# Patient Record
Sex: FEMALE | Race: ASIAN | NOT HISPANIC OR LATINO | URBAN - METROPOLITAN AREA
[De-identification: names, ages, dates, MRNs, and addresses within clinical notes are randomized per-mention and may not be internally consistent; named-entity substitution may affect disease eponyms.]

---

## 2022-12-31 ENCOUNTER — EMERGENCY (EMERGENCY)
Facility: HOSPITAL | Age: 87
LOS: 1 days | Discharge: HOME | End: 2022-12-31
Attending: STUDENT IN AN ORGANIZED HEALTH CARE EDUCATION/TRAINING PROGRAM
Payer: MEDICARE

## 2022-12-31 VITALS
SYSTOLIC BLOOD PRESSURE: 186 MMHG | OXYGEN SATURATION: 98 % | DIASTOLIC BLOOD PRESSURE: 96 MMHG | WEIGHT: 125 LBS | TEMPERATURE: 98 F | HEART RATE: 87 BPM | RESPIRATION RATE: 18 BRPM

## 2022-12-31 VITALS
OXYGEN SATURATION: 96 % | HEART RATE: 78 BPM | SYSTOLIC BLOOD PRESSURE: 179 MMHG | RESPIRATION RATE: 18 BRPM | TEMPERATURE: 98 F | DIASTOLIC BLOOD PRESSURE: 93 MMHG

## 2022-12-31 PROCEDURE — 99283 EMERGENCY DEPT VISIT LOW MDM: CPT

## 2022-12-31 PROCEDURE — 99284 EMERGENCY DEPT VISIT MOD MDM: CPT

## 2022-12-31 RX ORDER — LIDOCAINE 4 G/100G
1 CREAM TOPICAL ONCE
Refills: 0 | Status: COMPLETED | OUTPATIENT
Start: 2022-12-31 | End: 2022-12-31

## 2022-12-31 RX ORDER — CYCLOBENZAPRINE HYDROCHLORIDE 10 MG/1
5 TABLET, FILM COATED ORAL ONCE
Refills: 0 | Status: COMPLETED | OUTPATIENT
Start: 2022-12-31 | End: 2022-12-31

## 2022-12-31 RX ORDER — CYCLOBENZAPRINE HYDROCHLORIDE 10 MG/1
1 TABLET, FILM COATED ORAL
Qty: 12 | Refills: 0
Start: 2022-12-31 | End: 2023-01-02

## 2022-12-31 RX ORDER — IBUPROFEN 200 MG
600 TABLET ORAL ONCE
Refills: 0 | Status: COMPLETED | OUTPATIENT
Start: 2022-12-31 | End: 2022-12-31

## 2022-12-31 RX ORDER — IBUPROFEN 200 MG
1 TABLET ORAL
Qty: 30 | Refills: 0
Start: 2022-12-31 | End: 2023-01-09

## 2022-12-31 RX ORDER — ACETAMINOPHEN 500 MG
2 TABLET ORAL
Qty: 56 | Refills: 0
Start: 2022-12-31 | End: 2023-01-06

## 2022-12-31 RX ORDER — ACETAMINOPHEN 500 MG
975 TABLET ORAL ONCE
Refills: 0 | Status: COMPLETED | OUTPATIENT
Start: 2022-12-31 | End: 2022-12-31

## 2022-12-31 RX ADMIN — Medication 600 MILLIGRAM(S): at 10:28

## 2022-12-31 RX ADMIN — Medication 975 MILLIGRAM(S): at 10:28

## 2022-12-31 RX ADMIN — CYCLOBENZAPRINE HYDROCHLORIDE 5 MILLIGRAM(S): 10 TABLET, FILM COATED ORAL at 11:25

## 2022-12-31 RX ADMIN — Medication 975 MILLIGRAM(S): at 09:40

## 2022-12-31 RX ADMIN — LIDOCAINE 1 PATCH: 4 CREAM TOPICAL at 09:39

## 2022-12-31 RX ADMIN — Medication 600 MILLIGRAM(S): at 09:40

## 2022-12-31 NOTE — ED PROVIDER NOTE - PROGRESS NOTE DETAILS
pain improving. able to stand up and walk now. will give flexeril. stable for outpatient followup. Anshu Richmond

## 2022-12-31 NOTE — ED PROVIDER NOTE - PATIENT PORTAL LINK FT
You can access the FollowMyHealth Patient Portal offered by [facility] by registering at the following website: [email or web address]. By joining [facility]’s FollowMyHealth portal, you will also be able to view your health information using other applications (apps) compatible with our system.

## 2022-12-31 NOTE — ED PROVIDER NOTE - NSFOLLOWUPINSTRUCTIONS_ED_ALL_ED_FT
You were seen in the emergency department for lower back pain.     Please follow-up with your primary care doctor in the next 24-48 hours.     Please take Ibuprofen and Tylenol as prescribed on the bottles for pain control.     If you have any worsening symptoms, severe headache, chest pain, trouble breathing, weakness, numbness or tingling in your legs, please return to the emergency department.

## 2022-12-31 NOTE — ED PROVIDER NOTE - OBJECTIVE STATEMENT
87F, pmh of htn, presenting with back pain. patient reports for the past 3 days she has had lower back pain. pain does not radiate down to her legs. no weakness, numbness or tingling in legs. denies falling or other injuries. has had similar episodes in the past which resolved within a day. pain worse when trying to get up from laying down. no chest pain, trouble breathing, pain or burning with urination.

## 2022-12-31 NOTE — ED PROVIDER NOTE - PHYSICAL EXAMINATION
General: well appearing female, no acute distress   HEENT: normocephalic, atraumatic   Respiratory: normal work of breathing  MSK: no midline spinal tenderness to palpation, right lumbar paraspinal tenderness to palpation, 5/5 strength of lower extremities   Skin: warm, dry   Neuro: A&Ox3  Psych: appropriate affect

## 2022-12-31 NOTE — ED ADULT NURSE REASSESSMENT NOTE - NS ED NURSE REASSESS COMMENT FT1
patient lying on stretcher, reports relief in pain, ambulated with assistance to bathroom with no acute distress.

## 2023-01-03 ENCOUNTER — INPATIENT (INPATIENT)
Facility: HOSPITAL | Age: 88
LOS: 2 days | Discharge: HOME | DRG: 871 | End: 2023-01-06
Attending: HOSPITALIST | Admitting: HOSPITALIST
Payer: MEDICARE

## 2023-01-03 VITALS
TEMPERATURE: 101 F | SYSTOLIC BLOOD PRESSURE: 167 MMHG | DIASTOLIC BLOOD PRESSURE: 88 MMHG | HEART RATE: 110 BPM | OXYGEN SATURATION: 94 % | RESPIRATION RATE: 18 BRPM

## 2023-01-03 PROCEDURE — 71045 X-RAY EXAM CHEST 1 VIEW: CPT | Mod: 26

## 2023-01-03 PROCEDURE — 99285 EMERGENCY DEPT VISIT HI MDM: CPT

## 2023-01-03 RX ORDER — KETOROLAC TROMETHAMINE 30 MG/ML
30 SYRINGE (ML) INJECTION ONCE
Refills: 0 | Status: DISCONTINUED | OUTPATIENT
Start: 2023-01-03 | End: 2023-01-03

## 2023-01-03 RX ORDER — SODIUM CHLORIDE 9 MG/ML
1000 INJECTION INTRAMUSCULAR; INTRAVENOUS; SUBCUTANEOUS ONCE
Refills: 0 | Status: COMPLETED | OUTPATIENT
Start: 2023-01-03 | End: 2023-01-03

## 2023-01-03 NOTE — ED PROVIDER NOTE - CLINICAL SUMMARY MEDICAL DECISION MAKING FREE TEXT BOX
Patient febrile with coughing we will do sepsis work-up to evaluate for pneumonia versus COVID versus urinary tract infection.  If COVID, oxygen 94% will recheck oxygen to assess if patient needs to be admitted.  However patient is in no respiratory distress sitting in the bed.  She is actually no distress at all sitting and talking comfortably with her son.  She can likely be discharged however if this is not COVID she may need a sepsis work-up for bacterial sepsis.  Concerned that patient's back pain is getting worse despite all home therapies.  Will order CT to evaluate for pathology given patient's age and new onset back pain.  However this CAT scan is not emergent, will recommend son that he may get the CAT scan outpatient if he does not wish to wait.

## 2023-01-03 NOTE — ED PROVIDER NOTE - NS ED ROS FT
Pt denies   nausea, vomiting,   chest pain, palpitations  shortness of breath, orthopnea  abdominal pain, melena,   dysuria, hematuria   numbness, weakness, saddle anesthesia  rash  enlarged lymph nodes

## 2023-01-03 NOTE — ED PROVIDER NOTE - PHYSICAL EXAMINATION
General: Well appearing, no acute distress, appears stated age  HEENT: normocephalic, atraumatic   Respiratory: normal work of breathing  MSK: no swelling or tenderness of lower extremities, moving all extremities spontaneously however obviously uncomfortable    Skin: warm, dry  Neuro: A&Ox3, cranial nerves II-XII intact, 5/5 strength in all extremities, no sensory deficits, normal gait   Psych: appropriate affect

## 2023-01-03 NOTE — ED ADULT TRIAGE NOTE - CHIEF COMPLAINT QUOTE
Pt c/o cough, worsening back pain X 2 days  was seen here last Saturday and was sent home with medications  Daughter in law, whom she lives with, tested positive for COVID yesterday

## 2023-01-03 NOTE — ED PROVIDER NOTE - OBJECTIVE STATEMENT
87-year-old female presents with coughing, fever, and back pain.  Patient states she has been coughing for 2 days.  She was here for back pain 2 days ago and significantly worse today and bilateral.  She denies any incontinence weakness numbness paresthesias.

## 2023-01-03 NOTE — ED PROVIDER NOTE - PROGRESS NOTE DETAILS
patient ct show compression fracture. no signs of caude. family and patient state concern home safety given difficulty of adl, admitted for pt/ot eval

## 2023-01-04 DIAGNOSIS — J18.9 PNEUMONIA, UNSPECIFIED ORGANISM: ICD-10-CM

## 2023-01-04 DIAGNOSIS — S32.000A WEDGE COMPRESSION FRACTURE OF UNSPECIFIED LUMBAR VERTEBRA, INITIAL ENCOUNTER FOR CLOSED FRACTURE: ICD-10-CM

## 2023-01-04 DIAGNOSIS — E87.6 HYPOKALEMIA: ICD-10-CM

## 2023-01-04 DIAGNOSIS — U07.1 COVID-19: ICD-10-CM

## 2023-01-04 DIAGNOSIS — R93.89 ABNORMAL FINDINGS ON DIAGNOSTIC IMAGING OF OTHER SPECIFIED BODY STRUCTURES: ICD-10-CM

## 2023-01-04 DIAGNOSIS — Z29.9 ENCOUNTER FOR PROPHYLACTIC MEASURES, UNSPECIFIED: ICD-10-CM

## 2023-01-04 DIAGNOSIS — A41.9 SEPSIS, UNSPECIFIED ORGANISM: ICD-10-CM

## 2023-01-04 PROBLEM — I10 ESSENTIAL (PRIMARY) HYPERTENSION: Chronic | Status: ACTIVE | Noted: 2022-12-31

## 2023-01-04 LAB
ALBUMIN SERPL ELPH-MCNC: 3.2 G/DL — LOW (ref 3.5–5)
ALP SERPL-CCNC: 87 U/L — SIGNIFICANT CHANGE UP (ref 40–120)
ALT FLD-CCNC: 70 U/L DA — HIGH (ref 10–60)
ANION GAP SERPL CALC-SCNC: 10 MMOL/L — SIGNIFICANT CHANGE UP (ref 5–17)
ANION GAP SERPL CALC-SCNC: 7 MMOL/L — SIGNIFICANT CHANGE UP (ref 5–17)
APPEARANCE UR: CLEAR — SIGNIFICANT CHANGE UP
APTT BLD: 32.4 SEC — SIGNIFICANT CHANGE UP (ref 27.5–35.5)
AST SERPL-CCNC: 73 U/L — HIGH (ref 10–40)
BACTERIA # UR AUTO: ABNORMAL /HPF
BASOPHILS # BLD AUTO: 0.01 K/UL — SIGNIFICANT CHANGE UP (ref 0–0.2)
BASOPHILS NFR BLD AUTO: 0.2 % — SIGNIFICANT CHANGE UP (ref 0–2)
BILIRUB SERPL-MCNC: 0.4 MG/DL — SIGNIFICANT CHANGE UP (ref 0.2–1.2)
BILIRUB UR-MCNC: NEGATIVE — SIGNIFICANT CHANGE UP
BUN SERPL-MCNC: 12 MG/DL — SIGNIFICANT CHANGE UP (ref 7–18)
BUN SERPL-MCNC: 12 MG/DL — SIGNIFICANT CHANGE UP (ref 7–18)
CALCIUM SERPL-MCNC: 8.5 MG/DL — SIGNIFICANT CHANGE UP (ref 8.4–10.5)
CALCIUM SERPL-MCNC: 9.3 MG/DL — SIGNIFICANT CHANGE UP (ref 8.4–10.5)
CHLORIDE SERPL-SCNC: 107 MMOL/L — SIGNIFICANT CHANGE UP (ref 96–108)
CHLORIDE SERPL-SCNC: 108 MMOL/L — SIGNIFICANT CHANGE UP (ref 96–108)
CO2 SERPL-SCNC: 25 MMOL/L — SIGNIFICANT CHANGE UP (ref 22–31)
CO2 SERPL-SCNC: 27 MMOL/L — SIGNIFICANT CHANGE UP (ref 22–31)
COLOR SPEC: YELLOW — SIGNIFICANT CHANGE UP
CREAT SERPL-MCNC: 0.63 MG/DL — SIGNIFICANT CHANGE UP (ref 0.5–1.3)
CREAT SERPL-MCNC: 0.67 MG/DL — SIGNIFICANT CHANGE UP (ref 0.5–1.3)
DIFF PNL FLD: ABNORMAL
EGFR: 85 ML/MIN/1.73M2 — SIGNIFICANT CHANGE UP
EGFR: 86 ML/MIN/1.73M2 — SIGNIFICANT CHANGE UP
EOSINOPHIL # BLD AUTO: 0.02 K/UL — SIGNIFICANT CHANGE UP (ref 0–0.5)
EOSINOPHIL NFR BLD AUTO: 0.3 % — SIGNIFICANT CHANGE UP (ref 0–6)
EPI CELLS # UR: SIGNIFICANT CHANGE UP /HPF
FLUAV AG NPH QL: SIGNIFICANT CHANGE UP
FLUBV AG NPH QL: SIGNIFICANT CHANGE UP
GLUCOSE SERPL-MCNC: 132 MG/DL — HIGH (ref 70–99)
GLUCOSE SERPL-MCNC: 137 MG/DL — HIGH (ref 70–99)
GLUCOSE UR QL: NEGATIVE — SIGNIFICANT CHANGE UP
HCT VFR BLD CALC: 38.6 % — SIGNIFICANT CHANGE UP (ref 34.5–45)
HCT VFR BLD CALC: 40.6 % — SIGNIFICANT CHANGE UP (ref 34.5–45)
HGB BLD-MCNC: 12.9 G/DL — SIGNIFICANT CHANGE UP (ref 11.5–15.5)
HGB BLD-MCNC: 13.4 G/DL — SIGNIFICANT CHANGE UP (ref 11.5–15.5)
HIV 1 & 2 AB SERPL IA.RAPID: SIGNIFICANT CHANGE UP
IMM GRANULOCYTES NFR BLD AUTO: 0.3 % — SIGNIFICANT CHANGE UP (ref 0–0.9)
INR BLD: 1.12 RATIO — SIGNIFICANT CHANGE UP (ref 0.88–1.16)
KETONES UR-MCNC: NEGATIVE — SIGNIFICANT CHANGE UP
LACTATE SERPL-SCNC: 1.6 MMOL/L — SIGNIFICANT CHANGE UP (ref 0.7–2)
LEUKOCYTE ESTERASE UR-ACNC: NEGATIVE — SIGNIFICANT CHANGE UP
LYMPHOCYTES # BLD AUTO: 0.74 K/UL — LOW (ref 1–3.3)
LYMPHOCYTES # BLD AUTO: 12.4 % — LOW (ref 13–44)
MAGNESIUM SERPL-MCNC: 2 MG/DL — SIGNIFICANT CHANGE UP (ref 1.6–2.6)
MCHC RBC-ENTMCNC: 31.5 PG — SIGNIFICANT CHANGE UP (ref 27–34)
MCHC RBC-ENTMCNC: 32 PG — SIGNIFICANT CHANGE UP (ref 27–34)
MCHC RBC-ENTMCNC: 33 GM/DL — SIGNIFICANT CHANGE UP (ref 32–36)
MCHC RBC-ENTMCNC: 33.4 GM/DL — SIGNIFICANT CHANGE UP (ref 32–36)
MCV RBC AUTO: 95.5 FL — SIGNIFICANT CHANGE UP (ref 80–100)
MCV RBC AUTO: 95.8 FL — SIGNIFICANT CHANGE UP (ref 80–100)
MONOCYTES # BLD AUTO: 1.03 K/UL — HIGH (ref 0–0.9)
MONOCYTES NFR BLD AUTO: 17.3 % — HIGH (ref 2–14)
NEUTROPHILS # BLD AUTO: 4.15 K/UL — SIGNIFICANT CHANGE UP (ref 1.8–7.4)
NEUTROPHILS NFR BLD AUTO: 69.5 % — SIGNIFICANT CHANGE UP (ref 43–77)
NITRITE UR-MCNC: NEGATIVE — SIGNIFICANT CHANGE UP
NRBC # BLD: 0 /100 WBCS — SIGNIFICANT CHANGE UP (ref 0–0)
NRBC # BLD: 0 /100 WBCS — SIGNIFICANT CHANGE UP (ref 0–0)
PH UR: 6 — SIGNIFICANT CHANGE UP (ref 5–8)
PHOSPHATE SERPL-MCNC: 2.2 MG/DL — LOW (ref 2.5–4.5)
PLATELET # BLD AUTO: 114 K/UL — LOW (ref 150–400)
PLATELET # BLD AUTO: 121 K/UL — LOW (ref 150–400)
POTASSIUM SERPL-MCNC: 2.9 MMOL/L — CRITICAL LOW (ref 3.5–5.3)
POTASSIUM SERPL-MCNC: 3.1 MMOL/L — LOW (ref 3.5–5.3)
POTASSIUM SERPL-SCNC: 2.9 MMOL/L — CRITICAL LOW (ref 3.5–5.3)
POTASSIUM SERPL-SCNC: 3.1 MMOL/L — LOW (ref 3.5–5.3)
PROT SERPL-MCNC: 7.8 G/DL — SIGNIFICANT CHANGE UP (ref 6–8.3)
PROT UR-MCNC: 15
PROTHROM AB SERPL-ACNC: 13.3 SEC — SIGNIFICANT CHANGE UP (ref 10.5–13.4)
RBC # BLD: 4.03 M/UL — SIGNIFICANT CHANGE UP (ref 3.8–5.2)
RBC # BLD: 4.25 M/UL — SIGNIFICANT CHANGE UP (ref 3.8–5.2)
RBC # FLD: 12.6 % — SIGNIFICANT CHANGE UP (ref 10.3–14.5)
RBC # FLD: 12.7 % — SIGNIFICANT CHANGE UP (ref 10.3–14.5)
RBC CASTS # UR COMP ASSIST: SIGNIFICANT CHANGE UP /HPF (ref 0–2)
SARS-COV-2 RNA SPEC QL NAA+PROBE: DETECTED
SODIUM SERPL-SCNC: 142 MMOL/L — SIGNIFICANT CHANGE UP (ref 135–145)
SODIUM SERPL-SCNC: 142 MMOL/L — SIGNIFICANT CHANGE UP (ref 135–145)
SP GR SPEC: 1.01 — SIGNIFICANT CHANGE UP (ref 1.01–1.02)
UROBILINOGEN FLD QL: NEGATIVE — SIGNIFICANT CHANGE UP
WBC # BLD: 5.4 K/UL — SIGNIFICANT CHANGE UP (ref 3.8–10.5)
WBC # BLD: 5.97 K/UL — SIGNIFICANT CHANGE UP (ref 3.8–10.5)
WBC # FLD AUTO: 5.4 K/UL — SIGNIFICANT CHANGE UP (ref 3.8–10.5)
WBC # FLD AUTO: 5.97 K/UL — SIGNIFICANT CHANGE UP (ref 3.8–10.5)
WBC UR QL: SIGNIFICANT CHANGE UP /HPF (ref 0–5)

## 2023-01-04 PROCEDURE — 72131 CT LUMBAR SPINE W/O DYE: CPT | Mod: 26,MA

## 2023-01-04 PROCEDURE — 99223 1ST HOSP IP/OBS HIGH 75: CPT

## 2023-01-04 PROCEDURE — 74176 CT ABD & PELVIS W/O CONTRAST: CPT | Mod: 26,QQ

## 2023-01-04 RX ORDER — GUAIFENESIN/DEXTROMETHORPHAN 600MG-30MG
10 TABLET, EXTENDED RELEASE 12 HR ORAL EVERY 4 HOURS
Refills: 0 | Status: DISCONTINUED | OUTPATIENT
Start: 2023-01-04 | End: 2023-01-06

## 2023-01-04 RX ORDER — ACETAMINOPHEN 500 MG
650 TABLET ORAL EVERY 6 HOURS
Refills: 0 | Status: DISCONTINUED | OUTPATIENT
Start: 2023-01-04 | End: 2023-01-06

## 2023-01-04 RX ORDER — POTASSIUM CHLORIDE 20 MEQ
40 PACKET (EA) ORAL ONCE
Refills: 0 | Status: COMPLETED | OUTPATIENT
Start: 2023-01-04 | End: 2023-01-04

## 2023-01-04 RX ORDER — ENOXAPARIN SODIUM 100 MG/ML
40 INJECTION SUBCUTANEOUS EVERY 24 HOURS
Refills: 0 | Status: DISCONTINUED | OUTPATIENT
Start: 2023-01-04 | End: 2023-01-06

## 2023-01-04 RX ORDER — AZITHROMYCIN 500 MG/1
500 TABLET, FILM COATED ORAL EVERY 24 HOURS
Refills: 0 | Status: DISCONTINUED | OUTPATIENT
Start: 2023-01-05 | End: 2023-01-06

## 2023-01-04 RX ORDER — CEFTRIAXONE 500 MG/1
1000 INJECTION, POWDER, FOR SOLUTION INTRAMUSCULAR; INTRAVENOUS EVERY 24 HOURS
Refills: 0 | Status: DISCONTINUED | OUTPATIENT
Start: 2023-01-05 | End: 2023-01-06

## 2023-01-04 RX ORDER — INFLUENZA VIRUS VACCINE 15; 15; 15; 15 UG/.5ML; UG/.5ML; UG/.5ML; UG/.5ML
0.7 SUSPENSION INTRAMUSCULAR ONCE
Refills: 0 | Status: DISCONTINUED | OUTPATIENT
Start: 2023-01-04 | End: 2023-01-06

## 2023-01-04 RX ORDER — SENNA PLUS 8.6 MG/1
2 TABLET ORAL AT BEDTIME
Refills: 0 | Status: DISCONTINUED | OUTPATIENT
Start: 2023-01-04 | End: 2023-01-06

## 2023-01-04 RX ORDER — MORPHINE SULFATE 50 MG/1
1 CAPSULE, EXTENDED RELEASE ORAL EVERY 4 HOURS
Refills: 0 | Status: DISCONTINUED | OUTPATIENT
Start: 2023-01-04 | End: 2023-01-06

## 2023-01-04 RX ORDER — CEFTRIAXONE 500 MG/1
1000 INJECTION, POWDER, FOR SOLUTION INTRAMUSCULAR; INTRAVENOUS ONCE
Refills: 0 | Status: COMPLETED | OUTPATIENT
Start: 2023-01-04 | End: 2023-01-04

## 2023-01-04 RX ORDER — CEFTRIAXONE 500 MG/1
INJECTION, POWDER, FOR SOLUTION INTRAMUSCULAR; INTRAVENOUS
Refills: 0 | Status: DISCONTINUED | OUTPATIENT
Start: 2023-01-04 | End: 2023-01-06

## 2023-01-04 RX ORDER — AZITHROMYCIN 500 MG/1
TABLET, FILM COATED ORAL
Refills: 0 | Status: DISCONTINUED | OUTPATIENT
Start: 2023-01-04 | End: 2023-01-06

## 2023-01-04 RX ORDER — POLYETHYLENE GLYCOL 3350 17 G/17G
17 POWDER, FOR SOLUTION ORAL DAILY
Refills: 0 | Status: DISCONTINUED | OUTPATIENT
Start: 2023-01-04 | End: 2023-01-06

## 2023-01-04 RX ORDER — ACETAMINOPHEN 500 MG
650 TABLET ORAL ONCE
Refills: 0 | Status: COMPLETED | OUTPATIENT
Start: 2023-01-04 | End: 2023-01-04

## 2023-01-04 RX ORDER — AZITHROMYCIN 500 MG/1
500 TABLET, FILM COATED ORAL ONCE
Refills: 0 | Status: COMPLETED | OUTPATIENT
Start: 2023-01-04 | End: 2023-01-04

## 2023-01-04 RX ORDER — OXYCODONE HYDROCHLORIDE 5 MG/1
5 TABLET ORAL EVERY 4 HOURS
Refills: 0 | Status: DISCONTINUED | OUTPATIENT
Start: 2023-01-04 | End: 2023-01-06

## 2023-01-04 RX ADMIN — ENOXAPARIN SODIUM 40 MILLIGRAM(S): 100 INJECTION SUBCUTANEOUS at 13:22

## 2023-01-04 RX ADMIN — Medication 30 MILLIGRAM(S): at 00:36

## 2023-01-04 RX ADMIN — CEFTRIAXONE 1000 MILLIGRAM(S): 500 INJECTION, POWDER, FOR SOLUTION INTRAMUSCULAR; INTRAVENOUS at 09:30

## 2023-01-04 RX ADMIN — SENNA PLUS 2 TABLET(S): 8.6 TABLET ORAL at 21:48

## 2023-01-04 RX ADMIN — SODIUM CHLORIDE 1000 MILLILITER(S): 9 INJECTION INTRAMUSCULAR; INTRAVENOUS; SUBCUTANEOUS at 00:18

## 2023-01-04 RX ADMIN — POLYETHYLENE GLYCOL 3350 17 GRAM(S): 17 POWDER, FOR SOLUTION ORAL at 13:22

## 2023-01-04 RX ADMIN — OXYCODONE HYDROCHLORIDE 5 MILLIGRAM(S): 5 TABLET ORAL at 13:22

## 2023-01-04 RX ADMIN — Medication 40 MILLIEQUIVALENT(S): at 20:02

## 2023-01-04 RX ADMIN — Medication 40 MILLIEQUIVALENT(S): at 13:22

## 2023-01-04 RX ADMIN — AZITHROMYCIN 255 MILLIGRAM(S): 500 TABLET, FILM COATED ORAL at 10:16

## 2023-01-04 RX ADMIN — Medication 650 MILLIGRAM(S): at 01:11

## 2023-01-04 RX ADMIN — Medication 650 MILLIGRAM(S): at 22:27

## 2023-01-04 RX ADMIN — Medication 650 MILLIGRAM(S): at 23:25

## 2023-01-04 RX ADMIN — Medication 650 MILLIGRAM(S): at 00:41

## 2023-01-04 RX ADMIN — Medication 10 MILLILITER(S): at 20:33

## 2023-01-04 NOTE — H&P ADULT - PROBLEM SELECTOR PLAN 1
pt presents with lower back pain, noted to meet sepsis criteria with tachycardia and fever 101.2 F  lactate 1.6, cutlures drawn  found to be COVID+ so abx deferred  s/p 1 L NS in ED  CXR with possible LLL consolidation, concern for COVID with superimposed bacterial pneumonia    - start ceftriaxone, azithromycin  - obtain sputum culture, urine strep, legionella  - follow up cultures  - isolation precautions for COVID

## 2023-01-04 NOTE — PATIENT PROFILE ADULT - FALL HARM RISK - HARM RISK INTERVENTIONS

## 2023-01-04 NOTE — H&P ADULT - NSHPREVIEWOFSYSTEMS_GEN_ALL_CORE
REVIEW OF SYSTEMS:    CONSTITUTIONAL:  No weakness; +fevers  EYES/ENT:  No visual changes;  No vertigo or throat pain   NECK:  No pain or stiffness  RESPIRATORY:  No wheezing, hemoptysis; + cough; shortness of breath  CARDIOVASCULAR:  No chest pain or palpitations  GASTROINTESTINAL:  No abdominal or epigastric pain. No nausea, vomiting, or hematemesis; No diarrhea No melena or hematochezia.  GENITOURINARY:  No dysuria, frequency or hematuria  MUSCULOSKELETAL:  FROM all extremities, normal strength, No calf tenderness  NEUROLOGICAL:  No numbness or weakness  SKIN:  No itching, rashes

## 2023-01-04 NOTE — H&P ADULT - HISTORY OF PRESENT ILLNESS
87 year old F with unknown past medical history presents with right lower  back pain and cough. Patient reports she has had significant back pain to her right buttocks since saturday after grocery shopping and carrying over 20 lb of groceries home. States she does so once weekly but has not been able to move around as much due to pain. Denies urinary incontinence, stool incontinence, unsteadiness, or any weakness. Also reports she has had a productive cough with shortness of breath for 2 days and had a fever this morning. States her daughter in law has covid and they live together. Denies chest pain, palpitations, chills, n/v/d, urinary changes but reports constipation. Reports the back pain is controlled at this time.     ED Course  Vitals: /88 P 110 R 18 T 101.2F SpO2 94% RA  Meds: 1 L NS, 30 mg IV ketorolac, tylenol

## 2023-01-04 NOTE — H&P ADULT - ATTENDING COMMENTS
87 year old active mostly independent woman, lives at home with family here complaining of persistent right lower back pain. No preceding fall or trauma. No prior episodes. No lower extremity weakness or bladder or bowel incontinence.   She has cough, SOB  as well and noted to have high grade fever in the ED.     Vital Signs Last 24 Hrs  T(C): 36.8 (04 Jan 2023 05:41), Max: 38.4 (03 Jan 2023 21:26)  T(F): 98.2 (04 Jan 2023 05:41), Max: 101.2 (03 Jan 2023 21:26)  HR: 96 (04 Jan 2023 05:41) (96 - 110)  BP: 172/80 (04 Jan 2023 05:41) (155/81 - 172/80)  RR: 18 (04 Jan 2023 05:41) (18 - 18)  SpO2: 95% (04 Jan 2023 05:41) (94% - 95%)    Parameters below as of 04 Jan 2023 05:41  Patient On (Oxygen Delivery Method): room air    Exam as above    Labs reviewed  Increased monocytes   hypokalemia  +ve covid     CT L- spine    The bones are diffusely osteopenic.  An acute compression fracture in the superior endplate of L2 with approximately 4 mm bony retropulsion and approximately 40% loss of vertebral body height likely represents a benign compression fracture.    CT abdomen/pelvis   Within the posterior aspect of the right perinephric space, there is an indeterminant 1.2 x 1.2 cm soft tissue attenuation nodule.  Malignancy is not excluded.   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  No dilatation of the main pancreatic duct is visualized.    CXR   unable to r/o LLL lesion   Lower chest on CT abdomen with posterior LLL infiltrates    A/P   - Acute back pain with compression fracture of L2 vertebra  - Sepsis   -Suspected  acute LLL pneumonia  - COviD 19 infection   - Incidental findings in the renal area, pancreas and liver imaging   - Hypokalemia    Plan   - Admit to medicine / airborne precaution   - Pain control   - Sepsis work up   - Empiric antibiotics with ceftriaxone and azithromycin   - Supportive care   - Check electrolytes and correct appropriately  - Follow up imaging for incidental findings  - Goals of care discussion when able to reach family

## 2023-01-04 NOTE — H&P ADULT - PROBLEM SELECTOR PLAN 6
CT L spine also shows incidentally posterior aspect of the right perinephric space, an indeterminant 1.2 x 1.2 cm soft tissue attenuation nodule. 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  No dilatation of the main pancreatic duct  is visualized.    further workup as outpatient after group discussion of goals of care which includes patient's family

## 2023-01-04 NOTE — H&P ADULT - NSHPPHYSICALEXAM_GEN_ALL_CORE
PHYSICAL EXAM:  GENERAL: NAD, lying in bed comfortably  HEAD:  Atraumatic, Normocephalic  EYES: EOMI, PERRLA, conjunctiva and sclera clear  ENT: Moist mucous membranes  NECK: Supple, No JVD  CHEST/LUNG: Clear to auscultation bilaterally; No rales, rhonchi, wheezing, or rubs  HEART: Regular rate and rhythm; No murmurs, rubs, or gallops  ABDOMEN: Bowel sounds present; Soft, Nontender, Nondistended.  EXTREMITIES:  2+ Peripheral Pulses, brisk capillary refill. No clubbing, cyanosis, or edema  NERVOUS SYSTEM:  Alert & Oriented X3, speech clear. No deficits   MSK: FROM all 4 extremities, full and equal strength  SKIN: No rashes or lesions

## 2023-01-04 NOTE — CHART NOTE - NSCHARTNOTEFT_GEN_A_CORE
Brief HPI: 87 year old F with unknown past medical history presents with right lower  back pain and cough admitted for acute compression fracture of lumbar spine and sepsis.  Ortho consulted, pending eval.     Objective: Vital signs last  24hrs  Vital Signs Last 24 Hrs  T(C): 37.7 (04 Jan 2023 13:14), Max: 38.4 (03 Jan 2023 21:26)  T(F): 99.9 (04 Jan 2023 13:14), Max: 101.2 (03 Jan 2023 21:26)  HR: 97 (04 Jan 2023 13:14) (96 - 110)  BP: 167/91 (04 Jan 2023 13:14) (155/81 - 172/80)  BP(mean): --  RR: 18 (04 Jan 2023 13:14) (17 - 18)  SpO2: 96% (04 Jan 2023 13:14) (94% - 96%)    Parameters below as of 04 Jan 2023 13:14  Patient On (Oxygen Delivery Method): room air      Focus PE:  Constitutional: in bed resting comfortably w/o signs of distress  Neuro- alert and oriented x 3 speaking w/ clear articulate speech, CNII-VII grossly intact, extremities w/ equal strength,   Head: NCAT  Eyes- EOMI, PERRLA, clear conjunctiva and sclera, MMM  ENT- nml pharynx w/o erythema or exudates  Neck- supple, NT, no cervical LAD  CV-RRR, nml S1S2, no mmr, rubs or gallops  Resp- BL-CTA w/o increased WOB  GI- Abd sft, nt, nd, nr or guarding, no pulsatile mass+ BS  Extremities- all extremities w/ FROM, + distal pulses    Labs:                       12.9   5.40  )-----------( 114      ( 04 Jan 2023 10:56 )             38.6   01-04    142  |  108  |  12  ----------------------------<  132<H>  2.9<LL>   |  27  |  0.63    Ca    8.5      04 Jan 2023 10:56  Phos  2.2     01-04  Mg     2.0     01-04    TPro  7.8  /  Alb  3.2<L>  /  TBili  0.4  /  DBili  x   /  AST  73<H>  /  ALT  70<H>  /  AlkPhos  87  01-04      A/P:  Pt 87 year old F Mandarin speaking evaluated via [employer] [id number] with unknown past medical history presents with right lower  back pain and cough admitted for acute compression fracture of lumbar spine and sepsis.  Ortho consulted, pending eval.     Plan:  1. Continue w/ current pain regimen.  Report feeling better at time of eval            2. Hypo K+ on Am labs.  K replete           3. Continue Azithromycin and CTX for presumed PNA           4.  Left message for pt's son Chris Mcclure at  to discuss abnormal CT scan findings.     I have signed out the follow up of any pending tests (i.e. labs, radiological studies) to the oncoming night provider.. Brief HPI: 87 year old F with unknown past medical history presents with right lower  back pain and cough admitted for acute compression fracture of lumbar spine and sepsis.  Ortho consulted, pending eval.     Objective: Vital signs last  24hrs  Vital Signs Last 24 Hrs  T(C): 37.7 (04 Jan 2023 13:14), Max: 38.4 (03 Jan 2023 21:26)  T(F): 99.9 (04 Jan 2023 13:14), Max: 101.2 (03 Jan 2023 21:26)  HR: 97 (04 Jan 2023 13:14) (96 - 110)  BP: 167/91 (04 Jan 2023 13:14) (155/81 - 172/80)  BP(mean): --  RR: 18 (04 Jan 2023 13:14) (17 - 18)  SpO2: 96% (04 Jan 2023 13:14) (94% - 96%)    Parameters below as of 04 Jan 2023 13:14  Patient On (Oxygen Delivery Method): room air      Focus PE:  Constitutional: in bed resting comfortably w/o signs of distress  Neuro- alert and oriented x 3 speaking w/ clear articulate speech, CNII-VII grossly intact, extremities w/ equal strength,   Head: NCAT  Eyes- EOMI, PERRLA, clear conjunctiva and sclera, MMM  ENT- nml pharynx w/o erythema or exudates  Neck- supple, NT, no cervical LAD  CV-RRR, nml S1S2, no mmr, rubs or gallops  Resp- BL-CTA w/o increased WOB  GI- Abd sft, nt, nd, nr or guarding, no pulsatile mass+ BS  Extremities- all extremities w/ FROM, + distal pulses    Labs:                       12.9   5.40  )-----------( 114      ( 04 Jan 2023 10:56 )             38.6   01-04    142  |  108  |  12  ----------------------------<  132<H>  2.9<LL>   |  27  |  0.63    Ca    8.5      04 Jan 2023 10:56  Phos  2.2     01-04  Mg     2.0     01-04    TPro  7.8  /  Alb  3.2<L>  /  TBili  0.4  /  DBili  x   /  AST  73<H>  /  ALT  70<H>  /  AlkPhos  87  01-04      A/P:  Pt 87 year old F Mandarin speaking evaluated via [employer] [id number] with unknown past medical history presents with right lower  back pain and cough admitted for acute compression fracture of lumbar spine and sepsis.  Ortho consulted, pending eval.     Plan:  1. Continue w/ current pain regimen.  Report feeling better at time of eval            2. Hypo K+ on Am labs.  K replete           3. Continue Azithromycin and CTX for presumed PNA           4.  Continue COVID precaution- stable on RA           5.  Left message for pt's son Chris Mcclure at  to discuss abnormal CT scan findings.     I have signed out the follow up of any pending tests (i.e. labs, radiological studies) to the oncoming night provider.. Brief HPI: 87 year old F with unknown past medical history presents with right lower  back pain and cough admitted for acute compression fracture of lumbar spine and sepsis.  Ortho consulted, pending eval.     Objective: Vital signs last  24hrs  Vital Signs Last 24 Hrs  T(C): 37.7 (04 Jan 2023 13:14), Max: 38.4 (03 Jan 2023 21:26)  T(F): 99.9 (04 Jan 2023 13:14), Max: 101.2 (03 Jan 2023 21:26)  HR: 97 (04 Jan 2023 13:14) (96 - 110)  BP: 167/91 (04 Jan 2023 13:14) (155/81 - 172/80)  BP(mean): --  RR: 18 (04 Jan 2023 13:14) (17 - 18)  SpO2: 96% (04 Jan 2023 13:14) (94% - 96%)    Parameters below as of 04 Jan 2023 13:14  Patient On (Oxygen Delivery Method): room air      Focus PE:  Constitutional: in bed resting comfortably w/o signs of distress  Neuro- alert and oriented x 3 speaking w/ clear articulate speech, CNII-VII grossly intact, extremities w/ equal strength,   Head: NCAT  Eyes- EOMI, PERRLA, clear conjunctiva and sclera, MMM  ENT- nml pharynx w/o erythema or exudates  Neck- supple, NT, no cervical LAD  CV-RRR, nml S1S2, no mmr, rubs or gallops  Resp- BL-CTA w/o increased WOB  GI- Abd sft, nt, nd, nr or guarding, no pulsatile mass+ BS  Extremities- all extremities w/ FROM, + distal pulses    Labs:                       12.9   5.40  )-----------( 114      ( 04 Jan 2023 10:56 )             38.6   01-04    142  |  108  |  12  ----------------------------<  132<H>  2.9<LL>   |  27  |  0.63    Ca    8.5      04 Jan 2023 10:56  Phos  2.2     01-04  Mg     2.0     01-04    TPro  7.8  /  Alb  3.2<L>  /  TBili  0.4  /  DBili  x   /  AST  73<H>  /  ALT  70<H>  /  AlkPhos  87  01-04      A/P:  Pt 87 year old F Mandarin speaking evaluated via [employer] [id number] with unknown past medical history presents with right lower  back pain and cough admitted for acute compression fracture of lumbar spine and sepsis.  Ortho consulted, pending eval.     Plan:  1. Continue w/ current pain regimen.  Report feeling better at time of eval            2. Hypo K+ on Am labs.  K replete           3. Continue Azithromycin and CTX for presumed PNA           4.  Continue COVID precaution- stable on RA           5.  Left message for pt's son Chris Mcclure at  to discuss abnormal CT scan findings and to complete Med rec.      I have signed out the follow up of any pending tests (i.e. labs, radiological studies) to the oncoming night provider.. Brief HPI: 87 year old F with unknown past medical history presents with right lower  back pain and cough admitted for acute compression fracture of lumbar spine and sepsis.  Ortho consulted, pending eval.     Objective: Vital signs last  24hrs  Vital Signs Last 24 Hrs  T(C): 37.7 (04 Jan 2023 13:14), Max: 38.4 (03 Jan 2023 21:26)  T(F): 99.9 (04 Jan 2023 13:14), Max: 101.2 (03 Jan 2023 21:26)  HR: 97 (04 Jan 2023 13:14) (96 - 110)  BP: 167/91 (04 Jan 2023 13:14) (155/81 - 172/80)  BP(mean): --  RR: 18 (04 Jan 2023 13:14) (17 - 18)  SpO2: 96% (04 Jan 2023 13:14) (94% - 96%)    Parameters below as of 04 Jan 2023 13:14  Patient On (Oxygen Delivery Method): room air      Focus PE:  Constitutional: in bed resting comfortably w/o signs of distress  Neuro- alert and oriented x 3 speaking w/ clear articulate speech, CNII-VII grossly intact, extremities w/ equal strength,   Head: NCAT  Eyes- EOMI, PERRLA, clear conjunctiva and sclera, MMM  ENT- nml pharynx w/o erythema or exudates  Neck- supple, NT, no cervical LAD  CV-RRR, nml S1S2, no mmr, rubs or gallops  Resp- BL-CTA w/o increased WOB  GI- Abd sft, nt, nd, nr or guarding, no pulsatile mass+ BS  Extremities- all extremities w/ FROM, + distal pulses    Labs:                       12.9   5.40  )-----------( 114      ( 04 Jan 2023 10:56 )             38.6   01-04    142  |  108  |  12  ----------------------------<  132<H>  2.9<LL>   |  27  |  0.63    Ca    8.5      04 Jan 2023 10:56  Phos  2.2     01-04  Mg     2.0     01-04    TPro  7.8  /  Alb  3.2<L>  /  TBili  0.4  /  DBili  x   /  AST  73<H>  /  ALT  70<H>  /  AlkPhos  87  01-04      A/P:  Pt 87 year old F Mandarin speaking evaluated via [employer]  [employer] [id number] with unknown past medical history presents with right lower  back pain and cough admitted for acute compression fracture of lumbar spine and sepsis.  Ortho consulted, pending eval.     Plan:  1. Continue w/ current pain regimen.  Report feeling better at time of eval            2. Hypo K+ on Am labs.  K replete           3. Continue Azithromycin and CTX for presumed PNA, f/u blood and urine culture           4.  Continue COVID precaution- stable on RA           5.  Left message for pt's son Chris Mcclure at  to discuss abnormal CT scan findings and to complete Med rec.      I have signed out the follow up of any pending tests (i.e. labs, radiological studies) to the oncoming night provider.. Brief HPI: 87 year old F with unknown past medical history presents with right lower  back pain and cough admitted for acute compression fracture of lumbar spine and sepsis.  Ortho consulted, pending eval.     Objective: Vital signs last  24hrs  Vital Signs Last 24 Hrs  T(C): 37.7 (04 Jan 2023 13:14), Max: 38.4 (03 Jan 2023 21:26)  T(F): 99.9 (04 Jan 2023 13:14), Max: 101.2 (03 Jan 2023 21:26)  HR: 97 (04 Jan 2023 13:14) (96 - 110)  BP: 167/91 (04 Jan 2023 13:14) (155/81 - 172/80)  BP(mean): --  RR: 18 (04 Jan 2023 13:14) (17 - 18)  SpO2: 96% (04 Jan 2023 13:14) (94% - 96%)    Parameters below as of 04 Jan 2023 13:14  Patient On (Oxygen Delivery Method): room air      Focus PE:  Constitutional: in bed resting comfortably w/o signs of distress  Neuro- alert and oriented x 3 speaking w/ clear articulate speech, CNII-VII grossly intact, extremities w/ equal strength,   Head: NCAT  Eyes- EOMI, PERRLA, clear conjunctiva and sclera, MMM  ENT- nml pharynx w/o erythema or exudates  Neck- supple, NT, no cervical LAD  CV-RRR, nml S1S2, no mmr, rubs or gallops  Resp- BL-CTA w/o increased WOB  GI- Abd sft, nt, nd, nr or guarding, no pulsatile mass+ BS  Extremities- all extremities w/ FROM, + distal pulses    Labs:                       12.9   5.40  )-----------( 114      ( 04 Jan 2023 10:56 )             38.6   01-04    142  |  108  |  12  ----------------------------<  132<H>  2.9<LL>   |  27  |  0.63    Ca    8.5      04 Jan 2023 10:56  Phos  2.2     01-04  Mg     2.0     01-04    TPro  7.8  /  Alb  3.2<L>  /  TBili  0.4  /  DBili  x   /  AST  73<H>  /  ALT  70<H>  /  AlkPhos  87  01-04      A/P:  Pt 87 year old F Mandarin speaking evaluated via [employer] [id number] with unknown past medical history presents with right lower  back pain and cough admitted for acute compression fracture of lumbar spine and sepsis.  Ortho consulted, pending eval.     Plan:  1. Continue w/ current pain regimen.  Report feeling better at time of eval            2. Hypo K+ on Am labs.  K replete           3. Continue Azithromycin and CTX for presumed PNA, f/u blood and urine culture           4.  Continue COVID precaution- stable on RA           5.  Left message for pt's son Chris Mcclure at  to discuss abnormal CT scan findings and to complete Med rec.      I have signed out the follow up of any pending tests (i.e. labs, radiological studies) to the oncoming [facility] provider..    Addendum-19:45- Met with pt's son on 5S and discussed CT report with him at length in explicit details and agreed to proceed with CT scan to eval abd findings regarding perinephric nodule and pancreatic cyst.

## 2023-01-04 NOTE — H&P ADULT - PROBLEM SELECTOR PLAN 4
pt with severe LBP to right side since Saturday  regularly goes grocery shopping alone, lifts heavy weights by herself to carry groceries home  this past Saturday went grocery shopping with severe back pain afterwards  CT L spine shows acute compression fracture in the superior endplate of L2 with approximately 4 mm bony retropulsion and approximately 40% loss of vertebral body height likely represents a benign compression fracture as well as diffuse osteopenia    - ortho consult in AM for PT clearance  - PT eval  - pain control with morphine 1 mg q4h, oxycodone 5 mg q4h (moderate), acetaminophen

## 2023-01-04 NOTE — H&P ADULT - ASSESSMENT
87 year old F with unknown past medical history presents with right lower  back pain and cough admitted for acute compression fracture of lumbar spine and sepsis    unable to reach patients last Mcclure  PRIMARY TEAM TO PERFORM MED REC IN AM

## 2023-01-04 NOTE — CONSULT NOTE ADULT - SUBJECTIVE AND OBJECTIVE BOX
Pt Name: ARMIN BILLINGS  MRN: [id number]    86 y/o F with HTN presents with right lower back pain and cough. Patient reports she has had significant back pain to her right buttocks since saturday after grocery shopping and carrying over 20 lb of groceries home. States she does so once weekly but has not been able to move around as much due to the lower back and right buttock pain. Pt denies any radiating pains or numbness/tingling- states the pain is localized to the lower back and right buttock. Denies urinary/bowel incontinence, unsteadiness, or any weakness. Denies any hx of neck/back issues or injections/surgeries. Pt ambulates independently at baseline. Denies any trauma/falls.   In the ER patient was found to be covid (+) with a PNA- currently on antibx for covid PNA.       Ambulation: Walking independently [ ] With Cane [ ] With Walker [ ]  Bed / wheelchairbound [ ]     PAST MEDICAL & SURGICAL HISTORY:  HTN (hypertension)    Allergies: penicillin (Hives)      Medications: acetaminophen     Tablet .. 650 milliGRAM(s) Oral every 6 hours PRN  azithromycin  IVPB      cefTRIAXone   IVPB      enoxaparin Injectable 40 milliGRAM(s) SubCutaneous every 24 hours  guaifenesin/dextromethorphan Oral Liquid 10 milliLiter(s) Oral every 4 hours PRN  morphine  - Injectable 1 milliGRAM(s) IV Push every 4 hours PRN  oxyCODONE    IR 5 milliGRAM(s) Oral every 4 hours PRN  polyethylene glycol 3350 17 Gram(s) Oral daily  potassium chloride    Tablet ER 40 milliEquivalent(s) Oral once  senna 2 Tablet(s) Oral at bedtime      FAMILY HISTORY:  : non-contributory      PHYSICAL EXAM:    Vital Signs Last 24 Hrs  T(C): 37.7 (04 Jan 2023 13:14), Max: 38.4 (03 Jan 2023 21:26)  T(F): 99.9 (04 Jan 2023 13:14), Max: 101.2 (03 Jan 2023 21:26)  HR: 97 (04 Jan 2023 13:14) (96 - 110)  BP: 167/91 (04 Jan 2023 13:14) (155/81 - 172/80)  BP(mean): --  RR: 18 (04 Jan 2023 13:14) (17 - 18)  SpO2: 96% (04 Jan 2023 13:14) (94% - 96%)    Parameters below as of 04 Jan 2023 13:14  Patient On (Oxygen Delivery Method): room air    Daily     Appearance: Alert, A&Ox3, in no acute distress, laying comfortably in bed.    Back: Skin intact, no erythema/ecchymosis or swelling. No deformity, (+)TTP mildly to the right sided lumbar paraspinal muscles, no fluctuance felt.     Musculoskeletal:       Left Lower Extremity: Atraumatic with normal alignment NROM. No crepitus. No bony tenderness. (+)EHL/FHL/ADF/APF, 5/5 to EHL/FHL/ADF/APF, 5/5 to left thigh, NVI, SILT, 2+ pulses, Able to straight leg raise with (-) straight leg raise test, No calf tenderness, Calf soft.       Right Lower Extremity: Atraumatic with normal alignment NROM. No crepitus. No bony tenderness.  (+)EHL/FHL/ADF/APF, 5/5 to EHL/FHL/ADF/APF, 5/5 to right thigh, NVI, SILT, 2+ pulses, Able to straight leg raise with (-) straight leg raise test, No calf tenderness, Calf soft.      Labs:                        12.9   5.40  )-----------( 114      ( 04 Jan 2023 10:56 )             38.6     01-04    142  |  108  |  12  ----------------------------<  132<H>  2.9<LL>   |  27  |  0.63    Ca    8.5      04 Jan 2023 10:56  Phos  2.2     01-04  Mg     2.0     01-04    TPro  7.8  /  Alb  3.2<L>  /  TBili  0.4  /  DBili  x   /  AST  73<H>  /  ALT  70<H>  /  AlkPhos  87  01-04    CT of lumbar spine: An acute compression fracture in the superior endplate of L2 with approximately 4 mm bony retropulsion and approximately 40% loss of vertebral body height likely represents a benign compression fracture.      IMPRESSION:  86 y/o F w/L2 compression fx     PLAN:  -  D/w Dr. Oshea- recommends conservative management- PT, Pain control  -  DVT prophylaxis  -  Daily Physical Therapy- WBAT of the lower extremities. Proper body mechanics.  -  Case d/w medical team  -  Follow-up with Dr. Oshea in ONE WEEK at [phone]

## 2023-01-05 DIAGNOSIS — S32.000A WEDGE COMPRESSION FRACTURE OF UNSPECIFIED LUMBAR VERTEBRA, INITIAL ENCOUNTER FOR CLOSED FRACTURE: ICD-10-CM

## 2023-01-05 DIAGNOSIS — Z02.9 ENCOUNTER FOR ADMINISTRATIVE EXAMINATIONS, UNSPECIFIED: ICD-10-CM

## 2023-01-05 LAB
ANION GAP SERPL CALC-SCNC: 10 MMOL/L — SIGNIFICANT CHANGE UP (ref 5–17)
BUN SERPL-MCNC: 14 MG/DL — SIGNIFICANT CHANGE UP (ref 7–18)
CALCIUM SERPL-MCNC: 8.8 MG/DL — SIGNIFICANT CHANGE UP (ref 8.4–10.5)
CHLORIDE SERPL-SCNC: 106 MMOL/L — SIGNIFICANT CHANGE UP (ref 96–108)
CO2 SERPL-SCNC: 24 MMOL/L — SIGNIFICANT CHANGE UP (ref 22–31)
CREAT SERPL-MCNC: 0.57 MG/DL — SIGNIFICANT CHANGE UP (ref 0.5–1.3)
CULTURE RESULTS: SIGNIFICANT CHANGE UP
EGFR: 88 ML/MIN/1.73M2 — SIGNIFICANT CHANGE UP
GLUCOSE SERPL-MCNC: 108 MG/DL — HIGH (ref 70–99)
HCT VFR BLD CALC: 40.8 % — SIGNIFICANT CHANGE UP (ref 34.5–45)
HGB BLD-MCNC: 13.2 G/DL — SIGNIFICANT CHANGE UP (ref 11.5–15.5)
LEGIONELLA AG UR QL: NEGATIVE — SIGNIFICANT CHANGE UP
MCHC RBC-ENTMCNC: 31.3 PG — SIGNIFICANT CHANGE UP (ref 27–34)
MCHC RBC-ENTMCNC: 32.4 GM/DL — SIGNIFICANT CHANGE UP (ref 32–36)
MCV RBC AUTO: 96.7 FL — SIGNIFICANT CHANGE UP (ref 80–100)
NRBC # BLD: 0 /100 WBCS — SIGNIFICANT CHANGE UP (ref 0–0)
PLATELET # BLD AUTO: 125 K/UL — LOW (ref 150–400)
POTASSIUM SERPL-MCNC: 3.6 MMOL/L — SIGNIFICANT CHANGE UP (ref 3.5–5.3)
POTASSIUM SERPL-SCNC: 3.6 MMOL/L — SIGNIFICANT CHANGE UP (ref 3.5–5.3)
RBC # BLD: 4.22 M/UL — SIGNIFICANT CHANGE UP (ref 3.8–5.2)
RBC # FLD: 12.9 % — SIGNIFICANT CHANGE UP (ref 10.3–14.5)
SODIUM SERPL-SCNC: 140 MMOL/L — SIGNIFICANT CHANGE UP (ref 135–145)
SPECIMEN SOURCE: SIGNIFICANT CHANGE UP
WBC # BLD: 6.15 K/UL — SIGNIFICANT CHANGE UP (ref 3.8–10.5)
WBC # FLD AUTO: 6.15 K/UL — SIGNIFICANT CHANGE UP (ref 3.8–10.5)

## 2023-01-05 PROCEDURE — ZZZZZ: CPT

## 2023-01-05 RX ORDER — LANOLIN ALCOHOL/MO/W.PET/CERES
5 CREAM (GRAM) TOPICAL AT BEDTIME
Refills: 0 | Status: COMPLETED | OUTPATIENT
Start: 2023-01-05 | End: 2023-01-05

## 2023-01-05 RX ADMIN — Medication 650 MILLIGRAM(S): at 20:10

## 2023-01-05 RX ADMIN — Medication 650 MILLIGRAM(S): at 21:21

## 2023-01-05 RX ADMIN — ENOXAPARIN SODIUM 40 MILLIGRAM(S): 100 INJECTION SUBCUTANEOUS at 14:31

## 2023-01-05 RX ADMIN — Medication 650 MILLIGRAM(S): at 09:15

## 2023-01-05 RX ADMIN — AZITHROMYCIN 255 MILLIGRAM(S): 500 TABLET, FILM COATED ORAL at 08:11

## 2023-01-05 RX ADMIN — Medication 650 MILLIGRAM(S): at 08:12

## 2023-01-05 RX ADMIN — CEFTRIAXONE 100 MILLIGRAM(S): 500 INJECTION, POWDER, FOR SOLUTION INTRAMUSCULAR; INTRAVENOUS at 08:12

## 2023-01-05 RX ADMIN — Medication 5 MILLIGRAM(S): at 21:44

## 2023-01-05 RX ADMIN — SENNA PLUS 2 TABLET(S): 8.6 TABLET ORAL at 21:44

## 2023-01-05 NOTE — PROGRESS NOTE ADULT - PROBLEM SELECTOR PLAN 1
p/w severe LBP to right side since Saturday likely triggered by heavy lifting  -CT L spine shows acute compression fracture in the superior endplate of L2 with approximately 4 mm bony retropulsion and approximately 40% loss of vertebral body height likely represents a benign compression fracture as well as diffuse osteopenia  - Neurosurgery note and reccs appreciate  -Conservative mgnt with pain mgnt and PT  - pain control with morphine 1 mg q4h, oxycodone 5 mg q4h (moderate), acetaminophen  -Pt. to f/u with OP Dr. Oshea in one week p/w severe LBP to right side since Saturday likely triggered by heavy lifting  -CT L spine shows acute compression fracture in the superior endplate of L2 with approximately 4 mm bony retropulsion and approximately 40% loss of vertebral body height likely represents a benign compression fracture as well as diffuse osteopenia  -Neurosurgery note and reccs appreciate  -Conservative mgnt with pain mgnt and PT  -pain control with morphine 1 mg q4h, oxycodone 5 mg q4h (moderate), acetaminophen  -Pt. to f/u with OP Dr. Oshea in one week  -PT recc home with rolling walker and PT

## 2023-01-05 NOTE — PROGRESS NOTE ADULT - SUBJECTIVE AND OBJECTIVE BOX
NP Note discussed with  Primary Attending    Patient is a 87y old  Female who presents with a chief complaint of Back pain (2023 18:08)      INTERVAL HPI/OVERNIGHT EVENTS: no new complaints    MEDICATIONS  (STANDING):  azithromycin  IVPB      azithromycin  IVPB 500 milliGRAM(s) IV Intermittent every 24 hours  cefTRIAXone   IVPB 1000 milliGRAM(s) IV Intermittent every 24 hours  cefTRIAXone   IVPB      enoxaparin Injectable 40 milliGRAM(s) SubCutaneous every 24 hours  influenza  Vaccine (HIGH DOSE) 0.7 milliLiter(s) IntraMuscular once  polyethylene glycol 3350 17 Gram(s) Oral daily  senna 2 Tablet(s) Oral at bedtime    MEDICATIONS  (PRN):  acetaminophen     Tablet .. 650 milliGRAM(s) Oral every 6 hours PRN Temp greater or equal to 38C (100.4F), Mild Pain (1 - 3)  guaifenesin/dextromethorphan Oral Liquid 10 milliLiter(s) Oral every 4 hours PRN Cough  morphine  - Injectable 1 milliGRAM(s) IV Push every 4 hours PRN Severe Pain (7 - 10)  oxyCODONE    IR 5 milliGRAM(s) Oral every 4 hours PRN Moderate Pain (4 - 6)      __________________________________________________  REVIEW OF SYSTEMS:    CONSTITUTIONAL: No fever,   EYES: no acute visual disturbances  NECK: No pain or stiffness  RESPIRATORY: No cough; No shortness of breath  CARDIOVASCULAR: No chest pain, no palpitations  GASTROINTESTINAL: No pain. No nausea or vomiting; No diarrhea   NEUROLOGICAL: No headache or numbness, no tremors  MUSCULOSKELETAL: No joint pain, no muscle pain  GENITOURINARY: no dysuria, no frequency, no hesitancy  PSYCHIATRY: no depression , no anxiety  ALL OTHER  ROS negative        Vital Signs Last 24 Hrs  T(C): 37.4 (2023 05:11), Max: 38 (2023 21:13)  T(F): 99.3 (2023 05:11), Max: 100.4 (2023 21:13)  HR: 80 (2023 05:11) (80 - 97)  BP: 157/90 (2023 05:11) (156/86 - 167/91)  BP(mean): --  RR: 17 (2023 05:11) (17 - 18)  SpO2: 94% (2023 05:11) (94% - 96%)    Parameters below as of 2023 05:11  Patient On (Oxygen Delivery Method): room air        ________________________________________________  PHYSICAL EXAM:  well developed, well groomed  GENERAL: NAD  HEENT: Normocephalic;  conjunctivae and sclerae clear; moist mucous membranes;   NECK : supple  CHEST/LUNG: Clear to auscultation bilaterally with good air entry   HEART: S1 S2  regular; no murmurs, gallops or rubs  ABDOMEN: Soft, Nontender, Nondistended; Bowel sounds present  EXTREMITIES: no cyanosis; no edema; no calf tenderness  SKIN: warm and dry; no rash  NERVOUS SYSTEM:  Awake and alert; Oriented  to place, person and time ; no new deficits    _________________________________________________  LABS:                        13.2   6.15  )-----------( 125      ( 2023 07:28 )             40.8     01-05    140  |  106  |  14  ----------------------------<  108<H>  3.6   |  24  |  0.57    Ca    8.8      2023 07:28  Phos  2.2     01-04  Mg     2.0     01-04    TPro  7.8  /  Alb  3.2<L>  /  TBili  0.4  /  DBili  x   /  AST  73<H>  /  ALT  70<H>  /  AlkPhos  87  01-04    PT/INR - ( 2023 00:02 )   PT: 13.3 sec;   INR: 1.12 ratio         PTT - ( 2023 00:02 )  PTT:32.4 sec  Urinalysis Basic - ( 2023 01:00 )    Color: Yellow / Appearance: Clear / S.010 / pH: x  Gluc: x / Ketone: Negative  / Bili: Negative / Urobili: Negative   Blood: x / Protein: 15 / Nitrite: Negative   Leuk Esterase: Negative / RBC: 0-2 /HPF / WBC 0-2 /HPF   Sq Epi: x / Non Sq Epi: Few /HPF / Bacteria: Trace /HPF      CAPILLARY BLOOD GLUCOSE    RADIOLOGY & ADDITIONAL TESTS:    < from: CT Abdomen and Pelvis No Cont (23 @ 01:32) >    ACC: [id number] EXAM:  CT LUMBAR SPINE                        ACC: [id number] EXAM:  CT ABDOMEN AND PELVIS                          PROCEDURE DATE:  2023          INTERPRETATION:  CLINICAL INFORMATION: New onset back pain.  Rule out   malignancy.    COMPARISON: None.    CONTRAST/COMPLICATIONS:  IV Contrast: NONE  Oral Contrast: NONE  Complications: None reported at time of study completion    PROCEDURE:  1.  CT of the Abdomen and Pelvis was performed.  Sagittal and coronal reformats were performed.  2.  Axial, coronal and sagittal reformats of the lumbar spine were   generated from the source data of the CT abdomen and pelvis.    Three-dimensional reformats of the lumbar spine were performed.    FINDINGS:  LOWER CHEST: Elevated right hemidiaphragm.  Calcifications of aortic   valve leaflets.  Moderate atherosclerotic calcification the visualized   coronary arteries.    LIVER: The dome of the liver is incompletely imaged.  A 6 mm hypodense   focus in the posterior right hepatic lobe (2:13) is too small to   accurately characterize by CT technique.  BILE DUCTS: The common bile duct is mildly dilated to 9 mm.  GALLBLADDER: Within normal limits.  SPLEEN: Within normal limits.  PANCREAS: Nonspecific punctate calcification in the pancreatic head   (2:40).  Approximately 9 mm cystic focus in the pancreatic tail (2:31).  ADRENALS: Mild adrenal gland thickening without nodularity.  KIDNEYS/URETERS: No hydronephrosis or renal stones.  Within the posterior   aspect of the right perinephric space, there is an indeterminant 1.2 x   1.2 cm soft tissue attenuation nodule (2:35).    BLADDER: Within normal limits.  REPRODUCTIVE ORGANS: Uterus and adnexa appear within normal limits.    BOWEL: No bowel obstruction. Normal appendix.  PERITONEUM: No ascites.  VESSELS: Within normal limits.  RETROPERITONEUM/LYMPH NODES: No lymphadenopathy.  ABDOMINAL WALL: Within normal limits.  BONES: The bones are diffusely osteopenic.  The bony pelvis and   hips/proximal femurs are intact.     LUMBAR SPINE REFORMATS:  There are five lumbar-type vertebral bodies.  The last completely formed disc space appears to correspond to the L5-S1   level.  The bones are diffusely osteopenic.    There is partial straightening of the lumbar lordosis.  There is an acute appearing compression fracture in the superior endplate   of L2..  There is approximately 4 mm bony retropulsion from the superior   endplate of L2, causing mild to moderate spinal canal stenosis at the   L1-L2 level.  There is no displaced fracture, traumatic malalignment or suspicious   osseous lesion.    Degenerative changes:  L1-L2: Small disc bulge.  Bony retropulsion from the superior endplate of   L2.  Mild to moderate spinal canal stenosis.  No significant neural   foraminal narrowing.  L2-L3: Small disc bulge.  No significant spinal canal stenosis or neural   foraminal narrowing.  L3-L4: Minimal anterolisthesis.  Small disc bulge.  Mild to moderate   facet hypertrophy.  Slight prominence of the dorsal epidural fat.  Mild   to moderate spinal canal stenosis.  Minimal neural foraminal narrowing.  L4-L5: Small disc bulge.  Mild facet hypertrophy.  Mild prominence the   dorsal epidural fat.  Mild to moderate spinal canal stenosis.  Minimal   neural foraminal narrowing.  L5-S1: No spinal canal stenosis or neural foraminal narrowing.    IMPRESSION:  CT abdomen/pelvis:  1.  Evaluation for malignancy is limited without intravenous contrast.  2.  The dome of the liver is incompletely imaged.  3.  Within the posterior aspect of the right perinephric space, there is   an indeterminant 1.2 x 1.2 cm soft tissue attenuation nodule.  Malignancy   is not excluded.  Correlation with prior outside hospital imaging would   be of value.  Follow-up CT may be obtained in three months to exclude   interval change.  Further workup including PET CT or biopsy may be   obtained as clinically warranted.  4.  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  No dilatation of the main pancreatic duct   is visualized.    Lumbar spine reformats: The bones are diffusely osteopenic.  An acute   compression fracture in the superior endplate of L2 with approximately 4   mm bony retropulsion and approximately 40% loss of vertebral body height   likely represents a benign compression fracture.        --- End of Report ---    < end of copied text >    < from: Xray Chest 1 View- PORTABLE-Urgent (23 @ 23:00) >    ACC: [id number] EXAM:  XR CHEST PORTABLE URGENT 1V                          PROCEDURE DATE:  2023          INTERPRETATION:  CLINICAL INDICATION: 87 years  Female with Sepsis.    COMPARISON: None    Apical lordotic view of the chest demonstratesthe lungs to be clear.   There is no pleural effusion. The right diaphragm is elevated. The   pulmonary vasculature is normal. There is no pneumothorax.    The heart is normal in size. There is no mediastinal or hilar mass.    Diffuse osteopenia.    IMPRESSION:    No acute infiltrate. Elevated right diaphragm.    < end of copied text >    Flu With COVID-19 By PIEDAD (23 @ 00:02)    SARS-CoV-2 Result: Detected: This Respiratory Panel uses polymerase chain reaction (PCR) to detect for  influenza A; influenza B; and SARS-CoV-2.  This test was validated by [email or web address] and is in use under the FDA  Emergency Use Authorization (EUA) for clinical labs CLIA-certified to  perform high complexity testing. Test results should be correlated with  clinical presentation, patient history, and epidemiology.    Influenza A Result: NotDetec    Influenza B Result: NotDetec      Imaging Personally Reviewed:  YES/NO    Consultant(s) Notes Reviewed:   YES/ No    Care Discussed with Consultants :     Plan of care was discussed with patient and /or primary care giver; all questions and concerns were addressed and care was aligned with patient's wishes.

## 2023-01-05 NOTE — PHYSICAL THERAPY INITIAL EVALUATION ADULT - DIAGNOSIS, PT EVAL
back pain but presently managed with tylenol, able to walk with RW and CG and cueing, dependent mobility but close to her baseline  can benefit from home PT and use of walker at home

## 2023-01-05 NOTE — PROGRESS NOTE ADULT - PROBLEM SELECTOR PLAN 3
-Incidental finding on CT L spine  posterior aspect of the right perinephric space, an indeterminant 1.2 x 1.2 cm soft tissue attenuation nodule. 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  No dilatation of the main pancreatic duct  is visualized.  -f/u with CT A/P with con likely as OP

## 2023-01-05 NOTE — PROGRESS NOTE ADULT - PROBLEM SELECTOR PLAN 2
COVID PCR+, Flu-  -p/w low grade fever, intermittent cough  -CXR-->No acute infiltrate. Elevated right diaphragm.  -On RA O2 sats 94-95%  -Supportive care for now  -Contact/airborne isolation

## 2023-01-05 NOTE — PROGRESS NOTE ADULT - PROBLEM SELECTOR PLAN 5
Pt. lives with son and DIL  Home with no stairs  PT recc rolling walker and home PT  Discharge to home likely tomorrow 1/6

## 2023-01-05 NOTE — PROGRESS NOTE ADULT - ASSESSMENT
87 year old F with unknown past medical history presents with right lower  back pain and cough. Patient reports she has had significant back pain to her right buttocks since saturday after grocery shopping and carrying over 20 lb of groceries home. States she does so once weekly but has not been able to move around as much due to pain. Denies urinary incontinence, stool incontinence, unsteadiness, or any weakness. Also reports she has had a productive cough with shortness of breath for 2 days and had a fever this morning. States her daughter in law has covid and they live together.    Pt. admitted to medicine for L2 compression fx., and COVID.  As per neurosurgery, conservative mgnt of L2 Fx. with pain mgnt and daily PT.  Pt. to f/u with OP Dr. Oshea in one week.       87 year old F with unknown past medical history presents with right lower  back pain and cough. Patient reports she has had significant back pain to her right buttocks since saturday after grocery shopping and carrying over 20 lb of groceries home. States she does so once weekly but has not been able to move around as much due to pain. Denies urinary incontinence, stool incontinence, unsteadiness, or any weakness. Also reports she has had a productive cough with shortness of breath for 2 days and had a fever this morning. States her daughter in law has covid and they live together.    Pt. admitted to medicine for L2 compression fx., and COVID.  As per neurosurgery, conservative mgnt of L2 Fx. with pain mgnt and daily PT.  Pt. to f/u with OP Dr. Oshea in one week.  PT recc home with PT.

## 2023-01-05 NOTE — PHYSICAL THERAPY INITIAL EVALUATION ADULT - PERTINENT HX OF CURRENT PROBLEM, REHAB EVAL
Pt comes in with low back pain radiating to R buttock and began experiencing productive cough and fever- tested ++ for Covid  L2 compression fracture--neururgery evaluated pt and recommended daily PT WBAT

## 2023-01-06 ENCOUNTER — TRANSCRIPTION ENCOUNTER (OUTPATIENT)
Age: 88
End: 2023-01-06

## 2023-01-06 VITALS
HEART RATE: 92 BPM | TEMPERATURE: 100 F | DIASTOLIC BLOOD PRESSURE: 69 MMHG | RESPIRATION RATE: 17 BRPM | SYSTOLIC BLOOD PRESSURE: 113 MMHG | OXYGEN SATURATION: 95 %

## 2023-01-06 PROCEDURE — 71045 X-RAY EXAM CHEST 1 VIEW: CPT

## 2023-01-06 PROCEDURE — 86703 HIV-1/HIV-2 1 RESULT ANTBDY: CPT

## 2023-01-06 PROCEDURE — 87637 SARSCOV2&INF A&B&RSV AMP PRB: CPT

## 2023-01-06 PROCEDURE — 83605 ASSAY OF LACTIC ACID: CPT

## 2023-01-06 PROCEDURE — 87086 URINE CULTURE/COLONY COUNT: CPT

## 2023-01-06 PROCEDURE — 72131 CT LUMBAR SPINE W/O DYE: CPT | Mod: MA

## 2023-01-06 PROCEDURE — 81001 URINALYSIS AUTO W/SCOPE: CPT

## 2023-01-06 PROCEDURE — 83735 ASSAY OF MAGNESIUM: CPT

## 2023-01-06 PROCEDURE — 87040 BLOOD CULTURE FOR BACTERIA: CPT

## 2023-01-06 PROCEDURE — 87449 NOS EACH ORGANISM AG IA: CPT

## 2023-01-06 PROCEDURE — 96374 THER/PROPH/DIAG INJ IV PUSH: CPT

## 2023-01-06 PROCEDURE — 36415 COLL VENOUS BLD VENIPUNCTURE: CPT

## 2023-01-06 PROCEDURE — 85027 COMPLETE CBC AUTOMATED: CPT

## 2023-01-06 PROCEDURE — 87899 AGENT NOS ASSAY W/OPTIC: CPT

## 2023-01-06 PROCEDURE — 74176 CT ABD & PELVIS W/O CONTRAST: CPT | Mod: QQ

## 2023-01-06 PROCEDURE — 85610 PROTHROMBIN TIME: CPT

## 2023-01-06 PROCEDURE — 80048 BASIC METABOLIC PNL TOTAL CA: CPT

## 2023-01-06 PROCEDURE — 93005 ELECTROCARDIOGRAM TRACING: CPT

## 2023-01-06 PROCEDURE — 85025 COMPLETE CBC W/AUTO DIFF WBC: CPT

## 2023-01-06 PROCEDURE — 97162 PT EVAL MOD COMPLEX 30 MIN: CPT

## 2023-01-06 PROCEDURE — 85730 THROMBOPLASTIN TIME PARTIAL: CPT

## 2023-01-06 PROCEDURE — 84100 ASSAY OF PHOSPHORUS: CPT

## 2023-01-06 PROCEDURE — 99239 HOSP IP/OBS DSCHRG MGMT >30: CPT

## 2023-01-06 PROCEDURE — 80053 COMPREHEN METABOLIC PANEL: CPT

## 2023-01-06 PROCEDURE — 99285 EMERGENCY DEPT VISIT HI MDM: CPT

## 2023-01-06 RX ORDER — GUAIFENESIN/DEXTROMETHORPHAN 600MG-30MG
10 TABLET, EXTENDED RELEASE 12 HR ORAL
Qty: 180 | Refills: 0
Start: 2023-01-06 | End: 2023-01-08

## 2023-01-06 RX ORDER — SENNA PLUS 8.6 MG/1
2 TABLET ORAL
Qty: 0 | Refills: 0 | DISCHARGE
Start: 2023-01-06

## 2023-01-06 RX ORDER — SENNA PLUS 8.6 MG/1
2 TABLET ORAL
Qty: 60 | Refills: 0
Start: 2023-01-06 | End: 2023-02-04

## 2023-01-06 RX ORDER — POLYETHYLENE GLYCOL 3350 17 G/17G
17 POWDER, FOR SOLUTION ORAL
Qty: 0 | Refills: 0 | DISCHARGE
Start: 2023-01-06

## 2023-01-06 RX ORDER — LEVOFLOXACIN 5 MG/ML
1 INJECTION, SOLUTION INTRAVENOUS
Qty: 3 | Refills: 0
Start: 2023-01-06 | End: 2023-01-08

## 2023-01-06 RX ORDER — OXYCODONE HYDROCHLORIDE 5 MG/1
1 TABLET ORAL
Qty: 6 | Refills: 0
Start: 2023-01-06 | End: 2023-01-07

## 2023-01-06 RX ORDER — POLYETHYLENE GLYCOL 3350 17 G/17G
17 POWDER, FOR SOLUTION ORAL
Qty: 510 | Refills: 0
Start: 2023-01-06 | End: 2023-02-04

## 2023-01-06 RX ORDER — ACETAMINOPHEN 500 MG
2 TABLET ORAL
Qty: 18 | Refills: 0
Start: 2023-01-06 | End: 2023-01-08

## 2023-01-06 RX ADMIN — AZITHROMYCIN 255 MILLIGRAM(S): 500 TABLET, FILM COATED ORAL at 06:23

## 2023-01-06 RX ADMIN — POLYETHYLENE GLYCOL 3350 17 GRAM(S): 17 POWDER, FOR SOLUTION ORAL at 12:37

## 2023-01-06 RX ADMIN — Medication 650 MILLIGRAM(S): at 13:10

## 2023-01-06 RX ADMIN — Medication 650 MILLIGRAM(S): at 12:36

## 2023-01-06 RX ADMIN — MORPHINE SULFATE 1 MILLIGRAM(S): 50 CAPSULE, EXTENDED RELEASE ORAL at 11:30

## 2023-01-06 RX ADMIN — CEFTRIAXONE 100 MILLIGRAM(S): 500 INJECTION, POWDER, FOR SOLUTION INTRAMUSCULAR; INTRAVENOUS at 06:23

## 2023-01-06 RX ADMIN — Medication 650 MILLIGRAM(S): at 02:10

## 2023-01-06 RX ADMIN — Medication 650 MILLIGRAM(S): at 03:15

## 2023-01-06 RX ADMIN — MORPHINE SULFATE 1 MILLIGRAM(S): 50 CAPSULE, EXTENDED RELEASE ORAL at 10:58

## 2023-01-06 RX ADMIN — ENOXAPARIN SODIUM 40 MILLIGRAM(S): 100 INJECTION SUBCUTANEOUS at 12:37

## 2023-01-06 NOTE — DISCHARGE NOTE PROVIDER - CARE PROVIDER_API CALL
James Oshea (MD; JLUIS; MS)  Neurosurgery  [address], [address]  [place] [address]  Phone: [phone]  Fax: [phone]  Follow Up Time: 1 week    Ahmet Schmitz  PCP  Phone: (   )    -  Fax: (   )    -  Follow Up Time:    James Oshea; JLUIS; MS)  Neurosurgery  [address], [address]  [place] [address]  Phone: [phone]  Fax: [phone]  Follow Up Time: 1 week    Ahmet Schmitz  PCP  Phone: (   )    -  Fax: (   )    -  Follow Up Time:     Kwesi Fatima)  Hematology; Oncology  [address]  [place] [address]  Phone: [phone]  Fax: [phone]  Follow Up Time: 1 week

## 2023-01-06 NOTE — DISCHARGE NOTE PROVIDER - NSDCMRMEDTOKEN_GEN_ALL_CORE_FT
acetaminophen 500 mg oral tablet: 2 tab(s) orally every 6 hours, As Needed   cyclobenzaprine 5 mg oral tablet: 1 tab(s) orally every 6 hours, As Needed   ibuprofen 600 mg oral tablet: 1 tab(s) orally every 8 hours, As Needed for pain   acetaminophen 500 mg oral tablet: 2 tab(s) orally every 6 hours, As Needed   cyclobenzaprine 5 mg oral tablet: 1 tab(s) orally every 6 hours, As Needed   polyethylene glycol 3350 oral powder for reconstitution: 17 gram(s) orally once a day  senna leaf extract oral tablet: 2 tab(s) orally once a day (at bedtime)   acetaminophen 500 mg oral tablet: 2 tab(s) orally every 8 hours, As Needed   cyclobenzaprine 5 mg oral tablet: 1 tab(s) orally every 6 hours, As Needed   guaifenesin-dextromethorphan 100 mg-10 mg/5 mL oral liquid: 10 milliliter(s) orally every 4 hours, As needed, Cough  oxyCODONE 5 mg oral tablet: 1 tab(s) orally 3 times a day, As Needed -Moderate Pain (4 - 6) MDD:3 TABS  polyethylene glycol 3350 oral powder for reconstitution: 17 gram(s) orally once a day  senna leaf extract oral tablet: 2 tab(s) orally once a day (at bedtime)   acetaminophen 500 mg oral tablet: 2 tab(s) orally every 8 hours, As Needed   cyclobenzaprine 5 mg oral tablet: 1 tab(s) orally every 6 hours, As Needed   guaifenesin-dextromethorphan 100 mg-10 mg/5 mL oral liquid: 10 milliliter(s) orally every 4 hours, As needed, Cough  levoFLOXacin 750 mg oral tablet: 1 tab(s) orally once a day   oxyCODONE 5 mg oral tablet: 1 tab(s) orally 3 times a day, As Needed -Moderate Pain (4 - 6) MDD:3 TABS  polyethylene glycol 3350 oral powder for reconstitution: 17 gram(s) orally once a day  senna leaf extract oral tablet: 2 tab(s) orally once a day (at bedtime)

## 2023-01-06 NOTE — DISCHARGE NOTE NURSING/CASE MANAGEMENT/SOCIAL WORK - PATIENT PORTAL LINK FT
You can access the FollowMyHealth Patient Portal offered by [facility] by registering at the following website: [email or web address]. By joining [employer]’s FollowMyHealth portal, you will also be able to view your health information using other applications (apps) compatible with our system.

## 2023-01-06 NOTE — DISCHARGE NOTE PROVIDER - PROVIDER TOKENS
PROVIDER:[TOKEN:[[id number]],FOLLOWUP:[1 week]],FREE:[LAST:[Nadir],FIRST:[Ahmet],PHONE:[(   )    -],FAX:[(   )    -],ADDRESS:[PCP]] PROVIDER:[TOKEN:[[id number]],FOLLOWUP:[1 week]],FREE:[LAST:[Nadir],FIRST:[Ahmet],PHONE:[(   )    -],FAX:[(   )    -],ADDRESS:[PCP]],PROVIDER:[TOKEN:[[id number]],FOLLOWUP:[1 week]]

## 2023-01-06 NOTE — DISCHARGE NOTE PROVIDER - HOSPITAL COURSE
87 year old F with unknown past medical history presents with right lower  back pain and cough. Patient reports pain started after grocery shopping and carrying over 20 lb of groceries home. Also reports she has had a productive cough, shortness of breath for 2 days and had a fever this morning. States her daughter in law has COVID and they live together. In the ED found to be COVID +, saturating well on room air, no indication of remdesivir or dexamethasone. CXR with possible LLL consolidation, concern for COVID with superimposed bacterial pneumonia, started on Ceftriaxone/ Azithro. CT of the lumbar spine showed acute compression fracture in the superior endplate of L2, Neurosurgery consulted and recommended conservative management and to f/u OP in Dr. Oshea in 1 week. PT reccs home PT. CT also showed incidental finding of right perinephric space, an indeterminant 1.2 x 1.2 cm soft tissue attenuation nodule. as well as 9 mm cystic focus in the pancreatic tail, recommended to f/u outpatient with CT A/P w/IV contrast to further evaluate.    Patient medically optimized for discharge. Discharge discussed with attending.  Note this is just a brief course for full course please refer to daily progress and consult notes.

## 2023-01-06 NOTE — DISCHARGE NOTE PROVIDER - NSDCCPCAREPLAN_GEN_ALL_CORE_FT
PRINCIPAL DISCHARGE DIAGNOSIS  Diagnosis: Fracture, lumbar vertebra, compression  Assessment and Plan of Treatment: You presented to the hospital with back pain and a CT of lumbar spine showed  Lumbar spine reformats: The bones are diffusely osteopenic.  An acute   compression fracture in the superior endplate of L2 with approximately 4 mm bony retropulsion and approximately 40% loss of vertebral body height likely represents a benign compression fracture.  You were evaluated by the neurosurgery team and they recommended conservative management. No surgical intervention.  You should follow up with Dr. Oshea the neuro surgeron outpatient in 1 week.        SECONDARY DISCHARGE DIAGNOSES  Diagnosis: 2019 novel coronavirus disease (COVID-19)  Assessment and Plan of Treatment: You also presented to the hospital with cough and you were found to have COVID. You are oxygen saturation is above 92% on room air, and you did not require inpatient treatment for COVID. Please follow up with your PCP within 1-2 weeks of discharge.  You are strongly advised to seek prompt medical attention if your illness worsens or you develop new symptoms like fever or difficulty breathing.      Diagnosis: Abnormal finding on CT scan  Assessment and Plan of Treatment: On the Catscan that was performed of your spine and you were found to have an incidental finding of    Diagnosis: Pneumonia  Assessment and Plan of Treatment: You were suspected to have a superimprosed bacterial pneumonia on top of the your COVID and you were treated with IV antibiotics you will be sent home with oral anitbiotics and prescription will be sent to the pharmacy.  Pneumonia is a lung infection that can cause a fever, cough, and trouble breathing.  Continue all antibiotics as ordered until complete.  Nutrition is important, eat small frequent meals.  Get lots of rest and drink fluids.  Call your health care provider upon arrival home from hospital and make a follow up appointment for one week.  If your cough worsens, you develop fever greater than 101', you have shaking chills, a fast heartbeat, trouble breathing and/or feel your are breathing much faster than usual, call your healthcare provider.  Make sure you wash your hands frequently.       PRINCIPAL DISCHARGE DIAGNOSIS  Diagnosis: Fracture, lumbar vertebra, compression  Assessment and Plan of Treatment: You presented to the hospital with back pain and a CT of lumbar spine showed  Lumbar spine reformats: The bones are diffusely osteopenic.  An acute   compression fracture in the superior endplate of L2 with approximately 4 mm bony retropulsion and approximately 40% loss of vertebral body height likely represents a benign compression fracture.  You were evaluated by the neurosurgery team and they recommended conservative management. No surgical intervention.  You should follow up with Dr. Oshea the neuro surgeron outpatient in 1 week.        SECONDARY DISCHARGE DIAGNOSES  Diagnosis: 2019 novel coronavirus disease (COVID-19)  Assessment and Plan of Treatment: You also presented to the hospital with cough and you were found to have COVID. You are oxygen saturation is above 92% on room air, and you did not require inpatient treatment for COVID. Please follow up with your PCP within 1-2 weeks of discharge.  You are strongly advised to seek prompt medical attention if your illness worsens or you develop new symptoms like fever or difficulty breathing.      Diagnosis: Abnormal finding on CT scan  Assessment and Plan of Treatment: On the Catscan that was performed of your spine and you were found to have an incidental finding of   posterior aspect of the right perinephric space, an indeterminant 1.2 x 1.2 cm soft tissue attenuation nodule. 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  No dilatation of the main pancreatic duct  is visualized.   You will need to f/u up outpatient to get a Cat Scan Abdomen and Pelvis with IV contrast to further evaluate the above findings  You can follow up with the [facility]Oncologist group to continue  to the workup.    Diagnosis: Pneumonia  Assessment and Plan of Treatment: You were suspected to have a superimprosed bacterial pneumonia on top of the your COVID and you were treated with IV antibiotics you will be sent home with oral anitbiotics and prescription will be sent to the pharmacy.  Pneumonia is a lung infection that can cause a fever, cough, and trouble breathing.  Continue all antibiotics as ordered until complete.  Nutrition is important, eat small frequent meals.  Get lots of rest and drink fluids.  Call your health care provider upon arrival home from hospital and make a follow up appointment for one week.  If your cough worsens, you develop fever greater than 101', you have shaking chills, a fast heartbeat, trouble breathing and/or feel your are breathing much faster than usual, call your healthcare provider.  Make sure you wash your hands frequently.

## 2023-01-06 NOTE — DISCHARGE NOTE PROVIDER - DATE OF DISCHARGE SERVICE:
Patient presents with right-sided flank pain.  Labs UA CT done.  Positive UA, antibiotics given.  Found to have 3 mm obstructing renal stone.  Urology consulted for possibly infected stone.  Cleared for discharge with antibiotics and urology follow-up.  Return precautions discussed.
06-Jan-2023

## 2023-01-06 NOTE — DISCHARGE NOTE NURSING/CASE MANAGEMENT/SOCIAL WORK - NSDCPEFALRISK_GEN_ALL_CORE
For information on Fall & Injury Prevention, visit: [email or web address] OR  [email or web address] OR  https://www.cdc.gov/steadi/patient.html

## 2023-01-09 LAB
CULTURE RESULTS: SIGNIFICANT CHANGE UP
CULTURE RESULTS: SIGNIFICANT CHANGE UP
SPECIMEN SOURCE: SIGNIFICANT CHANGE UP
SPECIMEN SOURCE: SIGNIFICANT CHANGE UP

## 2023-01-10 LAB — S PNEUM AG UR QL: NEGATIVE — SIGNIFICANT CHANGE UP

## 2023-01-11 PROBLEM — Z00.00 ENCOUNTER FOR PREVENTIVE HEALTH EXAMINATION: Status: ACTIVE | Noted: 2023-01-11

## 2023-01-27 ENCOUNTER — TRANSCRIPTION ENCOUNTER (OUTPATIENT)
Age: 88
End: 2023-01-27

## 2023-01-27 ENCOUNTER — APPOINTMENT (OUTPATIENT)
Dept: NEUROSURGERY | Facility: CLINIC | Age: 88
End: 2023-01-27
Payer: MEDICARE

## 2023-01-27 VITALS
BODY MASS INDEX: 24.81 KG/M2 | DIASTOLIC BLOOD PRESSURE: 96 MMHG | SYSTOLIC BLOOD PRESSURE: 167 MMHG | OXYGEN SATURATION: 96 % | WEIGHT: 115 LBS | HEIGHT: 57 IN | HEART RATE: 96 BPM | TEMPERATURE: 98.1 F

## 2023-01-27 DIAGNOSIS — Z87.39 PERSONAL HISTORY OF OTHER DISEASES OF THE MUSCULOSKELETAL SYSTEM AND CONNECTIVE TISSUE: ICD-10-CM

## 2023-01-27 DIAGNOSIS — M54.9 DORSALGIA, UNSPECIFIED: ICD-10-CM

## 2023-01-27 DIAGNOSIS — E87.6 HYPOKALEMIA: ICD-10-CM

## 2023-01-27 DIAGNOSIS — Z86.79 PERSONAL HISTORY OF OTHER DISEASES OF THE CIRCULATORY SYSTEM: ICD-10-CM

## 2023-01-27 DIAGNOSIS — Z78.9 OTHER SPECIFIED HEALTH STATUS: ICD-10-CM

## 2023-01-27 DIAGNOSIS — J18.9 PNEUMONIA, UNSPECIFIED ORGANISM: ICD-10-CM

## 2023-01-27 DIAGNOSIS — S32.000A WEDGE COMPRESSION FRACTURE OF UNSPECIFIED LUMBAR VERTEBRA, INITIAL ENCOUNTER FOR CLOSED FRACTURE: ICD-10-CM

## 2023-01-27 DIAGNOSIS — M48.00 SPINAL STENOSIS, SITE UNSPECIFIED: ICD-10-CM

## 2023-01-27 PROCEDURE — 99215 OFFICE O/P EST HI 40 MIN: CPT

## 2023-01-27 RX ORDER — CYCLOBENZAPRINE HYDROCHLORIDE 5 MG/1
5 TABLET, FILM COATED ORAL 3 TIMES DAILY
Qty: 90 | Refills: 0 | Status: ACTIVE | COMMUNITY
Start: 2023-01-27 | End: 1900-01-01

## 2023-01-27 RX ORDER — GUAIFENESIN 1200 MG/1
500 TABLET, EXTENDED RELEASE ORAL
Refills: 0 | Status: ACTIVE | COMMUNITY

## 2023-01-27 RX ORDER — SENNOSIDES 8.6 MG/1
8.6 CAPSULE, GELATIN COATED ORAL
Refills: 0 | Status: ACTIVE | COMMUNITY

## 2023-01-27 RX ORDER — OXYCODONE HYDROCHLORIDE 5 MG/1
5 CAPSULE ORAL
Refills: 0 | Status: ACTIVE | COMMUNITY

## 2023-01-27 RX ORDER — CYCLOBENZAPRINE HYDROCHLORIDE 5 MG/1
5 TABLET, FILM COATED ORAL
Refills: 0 | Status: ACTIVE | COMMUNITY

## 2023-01-27 RX ORDER — DEXTROMETHORPHAN HBR. AND GUAIFENESIN 20; 200 MG/10ML; MG/10ML
10-100 SOLUTION ORAL
Refills: 0 | Status: ACTIVE | COMMUNITY

## 2023-01-27 RX ORDER — POLYETHYLENE GLYCOL 3350
POWDER (GRAM) MISCELLANEOUS
Refills: 0 | Status: ACTIVE | COMMUNITY

## 2023-01-27 NOTE — PHYSICAL EXAM
[General Appearance - Alert] : alert [General Appearance - In No Acute Distress] : in no acute distress [Oriented To Time, Place, And Person] : oriented to person, place, and time [Impaired Insight] : insight and judgment were intact [Affect] : the affect was normal [Normal] : normal [No Deficits] : no sensory deficits [5] : 5/5 Ankle Plantar Flexion (S1) [Straight-Leg Raise Test - Left] : straight leg raise of the left leg was negative [Straight-Leg Raise Test - Right] : straight leg raise  of the right leg was negative

## 2023-01-27 NOTE — RESULTS/DATA
[FreeTextEntry1] : There is partial straightening of the lumbar lordosis.\par There is an acute appearing compression fracture in the superior endplate of L2.. There is approximately 4 mm bony retropulsion from the superior endplate of L2, causing mild to moderate spinal canal stenosis at the L1-L2 level.\par There is no displaced fracture, traumatic malalignment or suspicious osseous lesion.\par \par Degenerative changes:\par L1-L2: Small disc bulge. Bony retropulsion from the superior endplate of L2. Mild to moderate spinal canal stenosis. No significant neural foraminal narrowing.\par L2-L3: Small disc bulge. No significant spinal canal stenosis or neural foraminal narrowing.\par L3-L4: Minimal anterolisthesis. Small disc bulge. Mild to moderate facet hypertrophy. Slight prominence of the dorsal epidural fat. Mild to moderate spinal canal stenosis. Minimal neural foraminal narrowing.\par L4-L5: Small disc bulge. Mild facet hypertrophy. Mild prominence the dorsal epidural fat. Mild to moderate spinal canal stenosis. Minimal neural foraminal narrowing.\par L5-S1: No spinal canal stenosis or neural foraminal narrowing.\par \par IMPRESSION:\par CT abdomen/pelvis:\par 1. Evaluation for malignancy is limited without intravenous contrast.\par 2. The dome of the liver is incompletely imaged.\par 3. Within the posterior aspect of the right perinephric space, there is an indeterminant 1.2 x 1.2 cm soft tissue attenuation nodule. Malignancy is not excluded. Correlation with prior outside hospital imaging would be of value. Follow-up CT may be obtained in three months to exclude interval change. Further workup including PET CT or biopsy may be obtained as clinically warranted.\par 4. 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 No dilatation of the main pancreatic duct is visualized.\par \par Lumbar spine reformats: The bones are diffusely osteopenic. An acute compression fracture in the superior endplate of L2 with approximately 4 mm bony retropulsion and approximately 40% loss of vertebral body height likely represents a benign compression fracture.\par \par

## 2023-01-27 NOTE — ASSESSMENT
[FreeTextEntry1] : MRs. Torres is an 88 y/o, female, with L2 compression fracture, spinal stenosis, and chronic low back pain. CT reviewed with pt and son in detail. Due to pt age and hx of osteoporosis conservative tx advised. Referral for TLSO brace provided for support. Referral for physical therapy. Rx for cyclobenzaprine to improve muscle spasm related pain. Pt to f/u with pcp for possible DXA scan. Pt to f/u in office in 3 months for re-evaluation.

## 2023-01-27 NOTE — REVIEW OF SYSTEMS
[As Noted in HPI] : as noted in HPI [Leg Weakness] : leg weakness [Negative] : Heme/Lymph [de-identified] : LBP

## 2023-01-27 NOTE — HISTORY OF PRESENT ILLNESS
[FreeTextEntry1] : LBP [de-identified] : Mrs. Torres is an 86 y/o, female, with a hx of HTN, and compression fracture, here for hospital f/u. She reports that in January, she was seen at [facility] for severe low back pain and during work up, CT lumbar spine indicated L2 acute compression fracture. She denies any falls, or trauma. Pt has been experiencing low back pain over the past year which has worsened and caused her to go to the ED multiple times due to the pain. She stated her lbp radiates to b/l buttocks and waist area. Her pain is currently controlled and worsens with movement to a 5/10. She denies numbness, tingling, urine incontinence. She ambulates with a walker due to weakness. She is currently undergoing physical therapy. She was treated with prolia injections in the past by her pcp, but stopped. She was d/c from [facility] with cyclobenzaprine and oxycodone, but did not take oxy.

## 2023-06-27 NOTE — ED ADULT TRIAGE NOTE - DIRECT TO ROOM CARE INITIATED:
No
Plastic Surgeon Procedure Text (A): After obtaining clear surgical margins the patient was sent to plastics for surgical repair.  The patient understands they will receive post-surgical care and follow-up from the referring physician's office.
31-Dec-2022 08:25

## 2024-02-28 NOTE — ED ADULT NURSE NOTE - TEMPLATE
Lab Results   Component Value Date    HSTNI0 2,584 (H) 02/17/2024    HSTNI2 2,049 (H) 02/17/2024    HSTNID2 -535 02/17/2024      CT chest showing no evidence of pulmonary embolism, did note mild pulmonary edema  No chest pain present, but significant SOB, anginal equivalent, sudden onset, fairly elevated troponins  EKG: Sinus Tachycardia with no obvious STEMI  Suspect a true type I NSTEMI  ADRIEN Score: 5  The patient's presentation with shortness of breath was somewhat sudden according to the son, he was driving her home from the rehab facility if she got acutely short of breath and hypoxemic again.  Certainly it is possible that the COVID infection may have triggered some degree of underlying CAD leading to an NSTEMI.  Can stop heparin drip  Cardiology following.  Cardiac cath showed significant triple vessel disease with 90% stenosis in the mid LAD, 80% stenosis in the proximal LAD, 85% stenosis in the mid Cx, and 70% in the RCA with a patent mid RCA stent. Cardiology recommends maximizing medical therapy for her CAD and follow-up outpatient with a CT surgeon for a potential CABG once she fully recovers from COVID.  Given new reduced EF of 35%, Lifevest has been fitted today  Patient is ready for rehab from cardiac standpoint       Back Pain